# Patient Record
Sex: OTHER/UNKNOWN | URBAN - METROPOLITAN AREA
[De-identification: names, ages, dates, MRNs, and addresses within clinical notes are randomized per-mention and may not be internally consistent; named-entity substitution may affect disease eponyms.]

---

## 2017-04-13 ENCOUNTER — APPOINTMENT (RX ONLY)
Dept: URBAN - METROPOLITAN AREA CLINIC 46 | Facility: CLINIC | Age: 32
Setting detail: DERMATOLOGY
End: 2017-04-13

## 2017-04-13 DIAGNOSIS — Z41.9 ENCOUNTER FOR PROCEDURE FOR PURPOSES OTHER THAN REMEDYING HEALTH STATE, UNSPECIFIED: ICD-10-CM

## 2017-04-13 PROCEDURE — ? LASER HAIR REMOVAL

## 2017-04-13 NOTE — PROCEDURE: LASER HAIR REMOVAL
Pulse Duration: 10 ms
Anesthesia Type: 1% lidocaine with epinephrine
Number Of Prepaid Treatments (Will Not Render If 0): 0
Spot Size: 18 mm
Render Post-Care In The Note: No
Fluence (Will Not Render If 0): 6
Treatment Number: 1
Fluence (Will Not Render If 0): [address] [facility]
Detail Level: Generalized
Pre-Procedure: all present put on their eye protection.
Spot Size: 12 mm
Cooling: DCD 40/40
Post-Procedure Care: Immediate endpoint: perifollicular erythema. Post care reviewed with patient. Tolerated procedure well. Applied hydrocortisone and applied SPF 30 sunscreen.
Post-Care Instructions: I reviewed with the patient in detail post-care instructions. Patient should avoid sun for a minimum of 4 weeks before and after treatment. Tolerated procedure well. Verbal and written post care instructions given to patient. \\nApplied SPF 27.
Fluence (Will Not Render If 0): 10
Laser Type: Alexandrite 755nm
Consent: Risks reviewed including but not limited to crusting, scabbing, blistering, scarring, darker or lighter pigmentary change, paradoxical hair regrowth, incomplete removal of hair. Consent obtained.